# Patient Record
Sex: FEMALE | Race: BLACK OR AFRICAN AMERICAN | Employment: FULL TIME | URBAN - METROPOLITAN AREA
[De-identification: names, ages, dates, MRNs, and addresses within clinical notes are randomized per-mention and may not be internally consistent; named-entity substitution may affect disease eponyms.]

---

## 2020-10-06 ENCOUNTER — APPOINTMENT (OUTPATIENT)
Dept: CT IMAGING | Age: 45
End: 2020-10-06
Payer: COMMERCIAL

## 2020-10-06 ENCOUNTER — HOSPITAL ENCOUNTER (EMERGENCY)
Age: 45
Discharge: HOME | End: 2020-10-06
Attending: EMERGENCY MEDICINE
Payer: COMMERCIAL

## 2020-10-06 ENCOUNTER — APPOINTMENT (OUTPATIENT)
Dept: ULTRASOUND IMAGING | Age: 45
End: 2020-10-06
Payer: COMMERCIAL

## 2020-10-06 VITALS
RESPIRATION RATE: 15 BRPM | SYSTOLIC BLOOD PRESSURE: 187 MMHG | DIASTOLIC BLOOD PRESSURE: 91 MMHG | BODY MASS INDEX: 41.81 KG/M2 | WEIGHT: 275 LBS | OXYGEN SATURATION: 100 % | HEART RATE: 78 BPM | TEMPERATURE: 98.5 F

## 2020-10-06 LAB
ALBUMIN SERPL-MCNC: 4.4 G/DL (ref 3.4–5)
ALP BLD-CCNC: 72 U/L (ref 40–129)
ALT SERPL-CCNC: 18 U/L (ref 10–40)
ANION GAP SERPL CALCULATED.3IONS-SCNC: 10 MMOL/L (ref 3–16)
AST SERPL-CCNC: 18 U/L (ref 15–37)
BACTERIA: ABNORMAL /HPF
BASOPHILS ABSOLUTE: 0 K/UL (ref 0–0.2)
BASOPHILS RELATIVE PERCENT: 0.5 %
BILIRUB SERPL-MCNC: 0.3 MG/DL (ref 0–1)
BILIRUBIN DIRECT: <0.2 MG/DL (ref 0–0.3)
BILIRUBIN URINE: NEGATIVE
BILIRUBIN, INDIRECT: ABNORMAL MG/DL (ref 0–1)
BLOOD, URINE: ABNORMAL
BUN BLDV-MCNC: 10 MG/DL (ref 7–20)
CALCIUM SERPL-MCNC: 9.4 MG/DL (ref 8.3–10.6)
CHLORIDE BLD-SCNC: 100 MMOL/L (ref 99–110)
CLARITY: CLEAR
CO2: 26 MMOL/L (ref 21–32)
COLOR: YELLOW
CREAT SERPL-MCNC: 0.9 MG/DL (ref 0.6–1.1)
EOSINOPHILS ABSOLUTE: 0.1 K/UL (ref 0–0.6)
EOSINOPHILS RELATIVE PERCENT: 1.1 %
EPITHELIAL CELLS, UA: 1 /HPF (ref 0–5)
GFR AFRICAN AMERICAN: >60
GFR NON-AFRICAN AMERICAN: >60
GLUCOSE BLD-MCNC: 163 MG/DL (ref 70–99)
GLUCOSE URINE: NEGATIVE MG/DL
HCT VFR BLD CALC: 37.7 % (ref 36–48)
HEMOGLOBIN: 12.9 G/DL (ref 12–16)
HYALINE CASTS: 1 /LPF (ref 0–8)
KETONES, URINE: NEGATIVE MG/DL
LEUKOCYTE ESTERASE, URINE: NEGATIVE
LIPASE: 28 U/L (ref 13–60)
LYMPHOCYTES ABSOLUTE: 1.9 K/UL (ref 1–5.1)
LYMPHOCYTES RELATIVE PERCENT: 25.8 %
MCH RBC QN AUTO: 33.5 PG (ref 26–34)
MCHC RBC AUTO-ENTMCNC: 34.2 G/DL (ref 31–36)
MCV RBC AUTO: 98 FL (ref 80–100)
MICROSCOPIC EXAMINATION: YES
MONOCYTES ABSOLUTE: 0.4 K/UL (ref 0–1.3)
MONOCYTES RELATIVE PERCENT: 5.7 %
NEUTROPHILS ABSOLUTE: 5 K/UL (ref 1.7–7.7)
NEUTROPHILS RELATIVE PERCENT: 66.9 %
NITRITE, URINE: NEGATIVE
PDW BLD-RTO: 13.7 % (ref 12.4–15.4)
PH UA: 8 (ref 5–8)
PLATELET # BLD: 348 K/UL (ref 135–450)
PMV BLD AUTO: 7.6 FL (ref 5–10.5)
POTASSIUM REFLEX MAGNESIUM: 4.6 MMOL/L (ref 3.5–5.1)
PROTEIN UA: NEGATIVE MG/DL
RBC # BLD: 3.85 M/UL (ref 4–5.2)
RBC UA: 13 /HPF (ref 0–4)
SODIUM BLD-SCNC: 136 MMOL/L (ref 136–145)
SPECIFIC GRAVITY UA: 1.01 (ref 1–1.03)
TOTAL PROTEIN: 8.3 G/DL (ref 6.4–8.2)
TROPONIN: <0.01 NG/ML
URINE TYPE: ABNORMAL
UROBILINOGEN, URINE: 1 E.U./DL
WBC # BLD: 7.4 K/UL (ref 4–11)
WBC UA: 1 /HPF (ref 0–5)

## 2020-10-06 PROCEDURE — 96372 THER/PROPH/DIAG INJ SC/IM: CPT

## 2020-10-06 PROCEDURE — 6360000002 HC RX W HCPCS: Performed by: EMERGENCY MEDICINE

## 2020-10-06 PROCEDURE — 84484 ASSAY OF TROPONIN QUANT: CPT

## 2020-10-06 PROCEDURE — 80048 BASIC METABOLIC PNL TOTAL CA: CPT

## 2020-10-06 PROCEDURE — 99284 EMERGENCY DEPT VISIT MOD MDM: CPT

## 2020-10-06 PROCEDURE — 83690 ASSAY OF LIPASE: CPT

## 2020-10-06 PROCEDURE — 80076 HEPATIC FUNCTION PANEL: CPT

## 2020-10-06 PROCEDURE — 96374 THER/PROPH/DIAG INJ IV PUSH: CPT

## 2020-10-06 PROCEDURE — 96375 TX/PRO/DX INJ NEW DRUG ADDON: CPT

## 2020-10-06 PROCEDURE — 2580000003 HC RX 258: Performed by: EMERGENCY MEDICINE

## 2020-10-06 PROCEDURE — 85025 COMPLETE CBC W/AUTO DIFF WBC: CPT

## 2020-10-06 PROCEDURE — 74176 CT ABD & PELVIS W/O CONTRAST: CPT

## 2020-10-06 PROCEDURE — 81001 URINALYSIS AUTO W/SCOPE: CPT

## 2020-10-06 PROCEDURE — 76705 ECHO EXAM OF ABDOMEN: CPT

## 2020-10-06 RX ORDER — PROMETHAZINE HYDROCHLORIDE 25 MG/ML
6.25 INJECTION, SOLUTION INTRAMUSCULAR; INTRAVENOUS ONCE
Status: COMPLETED | OUTPATIENT
Start: 2020-10-06 | End: 2020-10-06

## 2020-10-06 RX ORDER — MORPHINE SULFATE 4 MG/ML
4 INJECTION, SOLUTION INTRAMUSCULAR; INTRAVENOUS ONCE
Status: COMPLETED | OUTPATIENT
Start: 2020-10-06 | End: 2020-10-06

## 2020-10-06 RX ORDER — SODIUM CHLORIDE, SODIUM LACTATE, POTASSIUM CHLORIDE, CALCIUM CHLORIDE 600; 310; 30; 20 MG/100ML; MG/100ML; MG/100ML; MG/100ML
1000 INJECTION, SOLUTION INTRAVENOUS ONCE
Status: COMPLETED | OUTPATIENT
Start: 2020-10-06 | End: 2020-10-06

## 2020-10-06 RX ORDER — ONDANSETRON 2 MG/ML
4 INJECTION INTRAMUSCULAR; INTRAVENOUS ONCE
Status: COMPLETED | OUTPATIENT
Start: 2020-10-06 | End: 2020-10-06

## 2020-10-06 RX ORDER — FENTANYL CITRATE 50 UG/ML
25 INJECTION, SOLUTION INTRAMUSCULAR; INTRAVENOUS ONCE
Status: COMPLETED | OUTPATIENT
Start: 2020-10-06 | End: 2020-10-06

## 2020-10-06 RX ORDER — ONDANSETRON 4 MG/1
4 TABLET, ORALLY DISINTEGRATING ORAL 3 TIMES DAILY PRN
Qty: 21 TABLET | Refills: 0 | Status: SHIPPED | OUTPATIENT
Start: 2020-10-06

## 2020-10-06 RX ADMIN — SODIUM CHLORIDE, POTASSIUM CHLORIDE, SODIUM LACTATE AND CALCIUM CHLORIDE 1000 ML: 600; 310; 30; 20 INJECTION, SOLUTION INTRAVENOUS at 06:23

## 2020-10-06 RX ADMIN — PROMETHAZINE HYDROCHLORIDE 6.25 MG: 25 INJECTION INTRAMUSCULAR; INTRAVENOUS at 07:55

## 2020-10-06 RX ADMIN — ONDANSETRON 4 MG: 2 INJECTION INTRAMUSCULAR; INTRAVENOUS at 06:23

## 2020-10-06 RX ADMIN — FENTANYL CITRATE 25 MCG: 50 INJECTION INTRAMUSCULAR; INTRAVENOUS at 07:52

## 2020-10-06 RX ADMIN — MORPHINE SULFATE 4 MG: 4 INJECTION, SOLUTION INTRAMUSCULAR; INTRAVENOUS at 06:45

## 2020-10-06 ASSESSMENT — PAIN SCALES - GENERAL
PAINLEVEL_OUTOF10: 6
PAINLEVEL_OUTOF10: 6
PAINLEVEL_OUTOF10: 9
PAINLEVEL_OUTOF10: 9

## 2020-10-06 NOTE — ED NOTES
Reported to Dr. Alka Sarabia, that pt continues to complain about nausea and pain. Pt is on a continuous pulse oximetry and telemetry monitoring. Pt continued on cycling blood pressure. Fall risk precautions in place, call light in reach, bed side table within reach, , will continue to monitor.          [facility]  10/06/20 9575

## 2020-10-06 NOTE — ED NOTES
Pt continues to state she cant not provide urine sample.       Lexi Veras, [place]  10/06/20 3888

## 2020-10-06 NOTE — LETTER
[facility] Emergency Department      [address] [place] [address], [address]            PROOF OF PRESENCE      To Whom It May Concern:    Stacia Killian was present in the Emergency Department at [facility] on 10/6/2020.                                      Sincerely,        Keisha Bautista RN

## 2020-10-06 NOTE — ED NOTES
Reviewed discharge instructions with patient, verbalized understanding, ambulatory at time of discharge.         Leidy Noriega RN  10/06/20 0472

## 2020-10-06 NOTE — LETTER
[facility] Emergency Department  [address] [place] [address], [address]             October 6, 2020    Patient: Radha Emerson   YOB: 1975   Date of Visit: 10/6/2020       To Whom It May Concern: Radha Emerson was seen and treated in our emergency department on 10/6/2020. She may return to work on 10/8/2020.       Sincerely,         Tramaine Blas RN

## 2020-10-06 NOTE — ED NOTES
Pt resting in bed, suspine with emeesis bag, pt state she does not feeling any better after medications. Fluids continue to run. Dr. Audelia Cervantes made aware. Pt rates pain 9/10. Pt states she doesn't feel well enough to collect urine sample. Pt placed on continuous pulse oximetry and  placed on cycling blood pressure. Fall risk precautions in place, call light in reach, bed side table within reach,will continue to monitor.        Patito Funes RN  10/06/20 428 [facility] Doris Topete RN  10/06/20 4414

## 2020-10-06 NOTE — ED NOTES
MD asking this RN to get pt to Ambulated and provide urine sample, patient states she's unable to walk pt then states she can ride in wheelchair to get to bathroom, patient then provided urine sample, however continued to refuse to ambulate. MD made aware.       Tiki Douglass RN  10/06/20 9731

## 2020-10-06 NOTE — ED PROVIDER NOTES
[address] [facility]  EMERGENCY DEPARTMENTENCOUNTER      Pt Name: Nithya Mcleod  MRN: [id number]  [place] 1975  Date ofevaluation: 10/6/2020  Provider: Dorothy Moran MD    CHIEF COMPLAINT       Chief Complaint   Patient presents with    Emesis     pt states having generalized abominal pain and n/v \"all night\". denies any diarrhea. HPI    HISTORY OF PRESENT ILLNESS   (Location/Symptom, Timing/Onset,Context/Setting, Quality, Duration, Modifying Factors, Severity)  Note limiting factors. Nithya Mcleod is a 39 y.o. female who presents to the emergency department with abdominal pain. This is a 70-year-old female presents with left upper quadrant abdominal pain that started this morning. The patient's  states that she was eating some spicy wings last night. She denies any lower abdominal pain she is had some nausea vomiting as well. She denies any fevers or chills. NursingNotes were reviewed. Review of Systems    REVIEW OF SYSTEMS    (2-9 systems for level 4, 10 or more for level 5)     Review of Systems   Constitutional: Negative for fever. HENT: Negative for rhinorrhea and sore throat. Eyes: Negative for redness. Respiratory: Negative for shortness of breath. Cardiovascular: Negative for chest pain. Gastrointestinal: Positive for abdominal pain. Genitourinary: Negative for flank pain. Neurological: Negative for headaches. Hematological: Negative for adenopathy. Psychiatric/Behavioral: Negative for confusion. Except as noted above the remainder of the review of systems was reviewed and negative. PAST MEDICAL HISTORY   History reviewed. No pertinent past medical history. SURGICALHISTORY     History reviewed. No pertinent surgical history. CURRENT MEDICATIONS       Previous Medications    No medications on file       ALLERGIES     Patient has no known allergies.     FAMILY HISTORY     History reviewed. No pertinent family history. SOCIAL HISTORY       Social History     Socioeconomic History    Marital status:      Spouse name: None    Number of children: None    Years of education: None    Highest education level: None   Occupational History    None   Social Needs    Financial resource strain: None    Food insecurity     Worry: None     Inability: None    Transportation needs     Medical: None     Non-medical: None   Tobacco Use    Smoking status: Never Smoker    Smokeless tobacco: Never Used   Substance and Sexual Activity    Alcohol use: No    Drug use: No    Sexual activity: None   Lifestyle    Physical activity     Days per week: None     Minutes per session: None    Stress: None   Relationships    Social connections     Talks on phone: None     Gets together: None     Attends [employer] witness service: None     Active member of club or organization: None     Attends meetings of clubs or organizations: None     Relationship status: None    Intimate partner violence     Fear of current or ex partner: None     Emotionally abused: None     Physically abused: None     Forced sexual activity: None   Other Topics Concern    None   Social History Narrative    None       SCREENINGS             PHYSICAL EXAM    (up to 7 for level 4, 8 or more for level 5)     ED Triage Vitals   BP Temp Temp Source Pulse Resp SpO2 Height Weight   10/06/20 0553 10/06/20 0556 10/06/20 0556 10/06/20 0553 10/06/20 0551 10/06/20 0551 -- 10/06/20 0551   (!) 159/86 97.8 °F (36.6 °C) Oral 62 17 98 %  275 lb (124.7 kg)       Physical Exam:      General Appearance:  Alert, cooperative, appears stated age. Head:  Normocephalic, without obvious abnormality, atraumatic. Eyes:  conjunctiva/corneas clear, EOM's intact. Sclera anicteric. ENT:  Mucous membranes are moist and pink   Neck: Supple, symmetrical, trachea midline, no adenopathy. No jugular venous distention.      Lungs:    Clear to auscultation bilaterally   Chest Wall:   No pain to palpation   Heart:   Genitourinary:  Regular rate rhythm with no murmurs rubs gallops  Deferred   Abdomen:    No pain to palpation. Minimal pain in the left upper quadrant. Positive bowel sounds. No hepatosplenomegaly. No pain over McBurney's point. Extremities:  No clubbing cyanosis or edema   Pulses:  Good throughout   Skin:  No rashes or lesions to exposed skin. Neurologic: Alert and oriented X 3. DIAGNOSTIC RESULTS         RADIOLOGY:   Non-plain filmimages such as CT, Ultrasound and MRI are read by the radiologist. Plain radiographic images are visualized and preliminarily interpreted by the emergency physician with the below findings:    See below    Interpretation per the Radiologist below, if available at the time ofthis note: All incidental findings were discussed with the patient. US GALLBLADDER RUQ   Final Result   Fatty liver. Cholelithiasis. No acute cholecystitis. CT ABDOMEN PELVIS WO CONTRAST Additional Contrast? None   Preliminary Result   1. Distended gallbladder with gallstones. Further evaluation with   gallbladder ultrasound recommended. 2. Mild colonic diverticulosis. 3. Fatty liver. 4. Normal appendix. 5. No evidence for small bowel obstruction. 6. Mild bibasilar atelectasis and respiratory motion. 7. Cardiomegaly.                ED BEDSIDE ULTRASOUND:   Performed by ED Physician - none    LABS:  Labs Reviewed   CBC WITH AUTO DIFFERENTIAL - Abnormal; Notable for the following components:       Result Value    RBC 3.85 (*)     All other components within normal limits    Narrative:     Performed at:  [facility] [address], [place], [address]   Phone [phone]   BASIC METABOLIC PANEL W/ REFLEX TO MG FOR LOW K - Abnormal; Notable for the following components:    Glucose 163 (*)     All other components within normal limits    Narrative:     Performed at:  [facility]  [address] [place],  [place], [address] [employer] [facility]   Phone [phone]   HEPATIC FUNCTION PANEL - Abnormal; Notable for the following components: Total Protein 8.3 (*)     All other components within normal limits    Narrative:     Performed at:  [facility]  [address] [place], [address]   Phone [phone]   URINALYSIS - Abnormal; Notable for the following components:    Blood, Urine SMALL (*)     All other components within normal limits    Narrative:     Performed at:  [facility] [address], [address] [employer] [facility]   Phone [phone]   MICROSCOPIC URINALYSIS - Abnormal; Notable for the following components:    Bacteria, UA 1+ (*)     RBC, UA 13 (*)     All other components within normal limits    Narrative:     Performed at:  [facility] [address]  [place], [address]   Phone [phone]   TROPONIN    Narrative:     Performed at:  [facility] [address] [facility] [facility], [address]   Phone [phone]   LIPASE    Narrative:     Performed at:  [facility] [address] [facility]  [place], [address]   Phone [phone]       All other labs were within normal range or not returned as of this dictation. EMERGENCY DEPARTMENT COURSE and DIFFERENTIAL DIAGNOSIS/MDM:   Vitals:    Vitals:    10/06/20 0556 10/06/20 0830 10/06/20 0900 10/06/20 1000   BP:  (!) 151/81 (!) 160/80 (!) 160/76   Pulse:   66    Resp:   14    Temp: 97.8 °F (36.6 °C)      TempSrc: Oral      SpO2:  100% 100% 100%   Weight:               MDM    Patient has remained stable throughout her hospital course. Her work-up was extensive. A CT of the abdomen pelvis showed no acute findings except for some gallstones. A right upper quadrant gallbladder ultrasound was obtained that shows no evidence of cholecystitis.   She does have [address] lithiasis. The patient's work-up is not consistent with cholecystitis. The patient be discharged with some nausea medication and referral back to her primary care physician. She is to return if worse. I will also give the patient surgical referral.    REASSESSMENT              CONSULTS:  None    PROCEDURES:  Unless otherwise noted below, none     Procedures    FINAL IMPRESSION      1. Left upper quadrant abdominal pain    2. Non-intractable vomiting with nausea, unspecified vomiting type          DISPOSITION/PLAN   DISPOSITION Decision To Discharge 10/06/2020 12:12:56 PM      PATIENT REFERREDTO:  [facility]  [phone]  Call in 2 days  As needed      DISCHARGEMEDICATIONS:  New Prescriptions    ONDANSETRON (ZOFRAN-ODT) 4 MG DISINTEGRATING TABLET    Place 1 tablet under the tongue 3 times daily as needed for Nausea or Vomiting     Controlled Substances Monitoring:     No flowsheet data found.     (Please note that portions of this note were completed with a voice recognition program.  Efforts were made to edit the dictations but occasionally words are mis-transcribed.)    Isatu Barcenas MD (electronically signed)  Attending Emergency Physician          Isatu Barcenas MD  10/06/20 9160

## 2020-10-06 NOTE — ED NOTES
Pt sleeping but states her pain is still a 9/10. Pt also states nausea but falls back to sleep. No emesis since phenergan. Pt will not attempted to get up to collect urine. Pt is on a continuous pulse oximetry and telemetry monitoring. Pt continued on cycling blood pressure. Fall risk precautions in place, call light in reach, bed side table within reach,will continue to monitor.          [facility]  10/06/20 1023

## 2021-12-30 ENCOUNTER — APPOINTMENT (OUTPATIENT)
Dept: GENERAL RADIOLOGY | Age: 46
End: 2021-12-30

## 2021-12-30 ENCOUNTER — HOSPITAL ENCOUNTER (EMERGENCY)
Age: 46
Discharge: HOME | End: 2021-12-30

## 2021-12-30 VITALS
SYSTOLIC BLOOD PRESSURE: 169 MMHG | RESPIRATION RATE: 18 BRPM | DIASTOLIC BLOOD PRESSURE: 89 MMHG | BODY MASS INDEX: 46.38 KG/M2 | TEMPERATURE: 98.6 F | WEIGHT: 293 LBS | HEART RATE: 89 BPM | OXYGEN SATURATION: 99 %

## 2021-12-30 DIAGNOSIS — S92.255A CLOSED NONDISPLACED FRACTURE OF NAVICULAR BONE OF LEFT FOOT, INITIAL ENCOUNTER: Primary | ICD-10-CM

## 2021-12-30 PROCEDURE — 99284 EMERGENCY DEPT VISIT MOD MDM: CPT

## 2021-12-30 PROCEDURE — 29515 APPLICATION SHORT LEG SPLINT: CPT

## 2021-12-30 PROCEDURE — 6370000000 HC RX 637 (ALT 250 FOR IP): Performed by: PHYSICIAN ASSISTANT

## 2021-12-30 PROCEDURE — 73610 X-RAY EXAM OF ANKLE: CPT

## 2021-12-30 RX ORDER — NAPROXEN 250 MG/1
500 TABLET ORAL ONCE
Status: COMPLETED | OUTPATIENT
Start: 2021-12-30 | End: 2021-12-30

## 2021-12-30 RX ORDER — HYDROCODONE BITARTRATE AND ACETAMINOPHEN 5; 325 MG/1; MG/1
.5-1 TABLET ORAL EVERY 6 HOURS PRN
Qty: 10 TABLET | Refills: 0 | Status: SHIPPED | OUTPATIENT
Start: 2021-12-30 | End: 2022-01-02

## 2021-12-30 RX ORDER — ACETAMINOPHEN 500 MG
1000 TABLET ORAL ONCE
Status: COMPLETED | OUTPATIENT
Start: 2021-12-30 | End: 2021-12-30

## 2021-12-30 RX ADMIN — ACETAMINOPHEN 1000 MG: 500 TABLET ORAL at 15:07

## 2021-12-30 RX ADMIN — NAPROXEN 500 MG: 250 TABLET ORAL at 15:07

## 2021-12-30 ASSESSMENT — PAIN SCALES - GENERAL: PAINLEVEL_OUTOF10: 10

## 2021-12-30 NOTE — ED PROVIDER NOTES
[facility]        Pt Name: Krish Garrett  MRN: [id number]  [place] 1975  Date of evaluation: 12/30/2021  Provider: Sydni Rivera PA-C  [facility] ([facility])  Note Started: 3:31 PM EST       KYM. I have evaluated this patient. My supervising physician was available for consultation. CHIEF COMPLAINT       Chief Complaint   Patient presents with    Fall     Pt fell down roughly 4 steps, pain to left ankle up to knee        HISTORY OF PRESENT ILLNESS   (Location, Timing/Onset, Context/Setting, Quality, Duration, Modifying Factors, Severity, Associated Signs and Symptoms)  Note limiting factors. Chief Complaint: Left ankle pain    Krish Garrett is a 55 y.o. female who presents to the emergency department the chief complaint of some left ankle pain. She fell down approximately 4 steps directly well in her left leg with foot underneath her. She denies use of anticoagulants, hitting her head, loss of conscious, wounds. She is not taking anything for the pain yet. Rates the pain a 10 out of 10. Denies any previous history of injuries or surgeries to her left leg. States the pain is coming from the ankle but radiates up her leg to about the knee. Nursing Notes were all reviewed and agreed with or any disagreements were addressed in the HPI. REVIEW OF SYSTEMS    (2-9 systems for level 4, 10 or more for level 5)     Review of Systems    Positives and Pertinent negatives as per HPI. Except as noted above in the ROS, all other systems were reviewed and negative. PAST MEDICAL HISTORY   History reviewed. No pertinent past medical history. SURGICAL HISTORY   History reviewed. No pertinent surgical history.       [address]       Discharge Medication List as of 12/30/2021  4:55 PM      CONTINUE these medications which have NOT CHANGED    Details   ondansetron (ZOFRAN-ODT) 4 MG disintegrating tablet Place 1 tablet under the tongue 3 times daily as needed for Nausea or Vomiting, Disp-21 tablet,R-0Print               ALLERGIES     Patient has no known allergies. FAMILYHISTORY     History reviewed. No pertinent family history. SOCIAL HISTORY       Social History     Tobacco Use    Smoking status: Never Smoker    Smokeless tobacco: Never Used   Substance Use Topics    Alcohol use: No    Drug use: No       SCREENINGS             PHYSICAL EXAM    (up to 7 for level 4, 8 or more for level 5)     ED Triage Vitals 12/30/21 1418   BP Temp Temp src Pulse Resp SpO2 Height Weight   (!) 169/89 98.6 °F (37 °C) -- 89 18 99 % -- (!) 305 lb (138.3 kg)       Physical Exam  Vitals and nursing note reviewed. Constitutional:       Appearance: She is well-developed. She is not diaphoretic. HENT:      Head: Atraumatic. Nose: Nose normal.   Eyes:      General:         Right eye: No discharge. Left eye: No discharge. Cardiovascular:      Pulses: Normal pulses. Comments: 2+ dorsal pedal pulses left foot  Musculoskeletal:         General: Tenderness present. Cervical back: Normal range of motion. Comments: Some tenderness and swelling over the lateral aspect of the left ankle over the malleolus. No tenderness or deformity over the medial malleolus or Achilles. No tenderness of the left foot, knee. No obvious deformity, joint warmth, erythema or rash. Sensation light touch intact. Skin:     General: Skin is warm and dry. Findings: No erythema or rash. Neurological:      Mental Status: She is alert and oriented to person, place, and time. Cranial Nerves: No cranial nerve deficit. Psychiatric:         Behavior: Behavior normal.         DIAGNOSTIC RESULTS   LABS:    Labs Reviewed - No data to display    When ordered only abnormal lab results are displayed. All other labs were within normal range or not returned as of this dictation. EKG:  When ordered, EKG's are interpreted by the Emergency Department Physician in the absence of a cardiologist.  Please see their note for interpretation of EKG. RADIOLOGY:   Non-plain film images such as CT, Ultrasound and MRI are read by the radiologist. Plain radiographic images are visualized and preliminarily interpreted by the ED Provider with the below findings:        Interpretation per the Radiologist below, if available at the time of this note:    XR ANKLE LEFT (MIN 3 VIEWS)   Final Result   Traumatic, acute, nondisplaced fracture of the navicular. PROCEDURES   Unless otherwise noted below, none     Procedures   A posterior OCL splint was placed by emergency department technician. Splint placement was checked by myself and is in good placement and patient is distally neurovascular intact after placement. CRITICAL CARE TIME   N/A    CONSULTS:  None      EMERGENCY DEPARTMENT COURSE and DIFFERENTIAL DIAGNOSIS/MDM:   Vitals:    Vitals:    12/30/21 1418   BP: (!) 169/89   Pulse: 89   Resp: 18   Temp: 98.6 °F (37 °C)   SpO2: 99%   Weight: (!) 305 lb (138.3 kg)       Patient was given the following medications:  Medications   naproxen (NAPROSYN) tablet 500 mg (500 mg Oral Given 12/30/21 1507)   acetaminophen (TYLENOL) tablet 1,000 mg (1,000 mg Oral Given 12/30/21 1507)           Patient presented with some left ankle pain after a fall. She has a fracture of the navicular that is nondisplaced. After placement received a splint she is easily able to flex the knee and states she is feeling better. Is a closed injury and she has no pain over her knee to palpation. There is risk for any other acute injury. She will follow-up with orthopedics. This is a closed injury. She will return here for any worsening of symptoms or problems at home. FINAL IMPRESSION      1.  Closed nondisplaced fracture of navicular bone of left foot, initial encounter          DISPOSITION/PLAN   DISPOSITION Decision To Discharge 12/30/2021 04:49:17 PM      PATIENT REFERRED TO:  Harry Vee MD  [facility]  [address]  [phone]    Schedule an appointment as soon as possible for a visit   For re-check 3-5 days    [facility] Emergency Department  [address] [place] [address]  [phone]    As needed      DISCHARGE MEDICATIONS:  Discharge Medication List as of 12/30/2021  4:55 PM      START taking these medications    Details   HYDROcodone-acetaminophen (NORCO) 5-325 MG per tablet Take 0.5-1 tablets by mouth every 6 hours as needed for Pain for up to 3 days. , Disp-10 tablet, R-0Normal             DISCONTINUED MEDICATIONS:  Discharge Medication List as of 12/30/2021  4:55 PM                 (Please note that portions of this note were completed with a voice recognition program.  Efforts were made to edit the dictations but occasionally words are mis-transcribed.)    Ivon Sharma PA-C (electronically signed)            Ivon Sharma PA-C  12/30/21 1947

## 2022-01-04 ENCOUNTER — OFFICE VISIT (OUTPATIENT)
Dept: ORTHOPEDIC SURGERY | Age: 47
End: 2022-01-04

## 2022-01-04 VITALS — HEIGHT: 68 IN | WEIGHT: 293 LBS | BODY MASS INDEX: 44.41 KG/M2

## 2022-01-04 DIAGNOSIS — S92.255A CLOSED NONDISPLACED FRACTURE OF NAVICULAR BONE OF LEFT FOOT, INITIAL ENCOUNTER: Primary | ICD-10-CM

## 2022-01-04 PROCEDURE — L4361 PNEUMA/VAC WALK BOOT PRE OTS: HCPCS | Performed by: PHYSICIAN ASSISTANT

## 2022-01-04 PROCEDURE — 99203 OFFICE O/P NEW LOW 30 MIN: CPT | Performed by: PHYSICIAN ASSISTANT

## 2022-01-04 NOTE — PROGRESS NOTES
Patient Name: Selina Mason  Medical Record Number: [id number]  YOB: 1975  Date of Encounter: 1/4/2022     Chief Complaint   Patient presents with    New Patient     Left foot, fell down stairs on 12/30/21. History of Present Illness: Selina Mason is a 55 y.o. female here for evaluation of her left foot. Her pain assessment is documented below and I reviewed this with her today. Patient went to the emergency department on 12/30/2021 stating just prior to arrival she stumbled and fell down 4 stairs at her home. She injured her left foot in the process. She had x-rays completed showing a nondisplaced fracture of the navicular. Patient was fitted for a posterior OCL splint and crutches. She presents today stating her pain can go up to an 8/10. She is walking on her OCL splint. Pain Assessment  Location Modifiers: Left  Severity of Pain: 8  Quality of Pain: Aching,Sharp  Duration of Pain: Persistent  Frequency of Pain: Constant  Date Pain First Started: 12/30/21  Aggravating Factors: Walking  Limiting Behavior: Some  Relieving Factors: Rest  Result of Injury: Yes  Work-Related Injury: No  Are there other pain locations you wish to document?: No    History reviewed. No pertinent past medical history. History reviewed. No pertinent surgical history. Current Outpatient Medications   Medication Sig Dispense Refill    ondansetron (ZOFRAN-ODT) 4 MG disintegrating tablet Place 1 tablet under the tongue 3 times daily as needed for Nausea or Vomiting 21 tablet 0     No current facility-administered medications for this visit. Allergies, social and family histories were reviewed and updated as appropriate. Review of Systems:  Relevant review of systems reviewed and available in the patient's chart under the 'MEDIA' tab.     Vital Signs:  Ht 5' 8\" (1.727 m)   Wt (!) 321 lb 12.8 oz (146 kg)   BMI 48.93 kg/m²     General Exam:   Constitutional: Patient is adequately groomed with no evidence of malnutrition  Mental Status: The patient is oriented to time, place and person. The patient's mood and affect are appropriate. Lymphatic: The lymphatic examination bilaterally reveals all areas to be without enlargement or induration. Vascular: Examination reveals no swelling or calf tenderness. Peripheral pulses are palpable and 2+. Neurological: The patient has good coordination. There is no focal weakness or sensory deficit. Left Foot Examination:    Inspection: Normal ankle and foot alignment. Normal muscle contours and no significant limb length discrepancy. No gross atrophy in any particular myotome. There is mild swelling of the left foot. There are no abrasions, lacerations, contusions, hematomas or ecchymosis. There is no erythema, induration or warmth to suggest an infectious process. Palpation: Patient has tenderness on palpation of the entire midfoot    Ankle Range of Motion: Patient is hesitant to perform ankle range of motion exercises     Ankle Strength: Unable to test secondary to pain    Skin: There are no rashes, ulcerations or lesions. Sensation to light touch intact    Circulation: The limb is warm and well perfused. Distal pulses intact. Capillary refill is intact. Edema: none. Venous stasis changes: none. Gait: Patient has a antalgic gait and is taking short strides. Radiology:     Left foot x-rays completed in the emergency department on 12/30/2021 and reviewed in office today:       FINDINGS:   The ankle mortise is symmetrical.  There are ossicle-like projections of the   medial malleolus, consistent with remote injuries. Lexus Rivers is a transversely   oriented lucency along the superior aspect of the navicular.  Articular   surfaces appear congruent.  Calcaneal enthesopathy. Orders:  Orders Placed This Encounter   Procedures    Breg Short Isatu Walking Boot     Patient was prescribed a Breg Short Isatu Walking Boot.   The left foot will require stabilization / immobilization from this semi-rigid / rigid orthosis to improve their function. The orthosis will assist in protecting the affected area, provide functional support and facilitate healing. Patient was instructed to progress ambulation weight bearing as tolerated in the device. The patient was educated and fit by a healthcare professional with expert knowledge and specialization in brace application while under the direct supervision of the physician. Verbal and written instructions for the use of and application of this item were provided. They were instructed to contact the office immediately should the brace result in increased pain, decreased sensation, increased swelling or worsening of the condition. Impression:   Diagnosis Orders   1. Closed nondisplaced fracture of navicular bone of left foot, initial encounter  Jonny Short Isatu Walking Boot       Treatment Plan:          Patient sustained a nondisplaced left foot navicular fracture on 12/30/2021. Her fiberglass splint is removed. She states pain is improving. She is fitted for a walking boot. She can weight-bear as tolerated on her heel. She will minimize weightbearing as much as possible. She is advised to continue resting, icing and elevating the left foot. Advised patient she can remove the boot to shower and sleep. Otherwise, she will remove the boot when sitting and start performing ankle range of motion exercises. Patient will plan on following back in 2 weeks at which time we will repeat x-rays of the left foot. She is advised to follow back before then with any concerns. Vania Garcia was informed of the results of any imaging. We discussed treatment options and a time was given to answer questions. A plan was proposed and Vania Garcia understand and accepts this course of care.         Electronically signed by Eda Gavin PA-C on 2/5/0236  Board Certified Physician [facility]    Please note that portions of this note were completed with a voice recognition program.  Efforts were made to edit the dictations but occasionally words are mis-transcribed.

## 2022-01-20 ENCOUNTER — OFFICE VISIT (OUTPATIENT)
Dept: ORTHOPEDIC SURGERY | Age: 47
End: 2022-01-20

## 2022-01-20 VITALS — BODY MASS INDEX: 44.41 KG/M2 | HEIGHT: 68 IN | WEIGHT: 293 LBS

## 2022-01-20 DIAGNOSIS — S92.255D CLOSED NONDISPLACED FRACTURE OF NAVICULAR BONE OF LEFT FOOT WITH ROUTINE HEALING, SUBSEQUENT ENCOUNTER: Primary | ICD-10-CM

## 2022-01-20 PROCEDURE — 99213 OFFICE O/P EST LOW 20 MIN: CPT | Performed by: PHYSICIAN ASSISTANT

## 2022-01-20 NOTE — PROGRESS NOTES
Patient Name: Cortney Millan  Medical Record Number: [id number]  YOB: 1975  Date of Encounter: 1/20/2022     Chief Complaint   Patient presents with    Follow-up     fu left foot fx       History of Present Illness:   Ms. Cortney Millan is here in 3 week follow up regarding her left foot nondisplaced navicular fracture she sustained on 12/30/2021 when she stumbled and fell down 4 stairs. After discussing the case with Dr. Bette Wilson, the decision was made to proceed nonoperatively. Patient was fitted for a walking boot and told she could weight-bear as tolerated on her heel. She presents today stating the pain has improved tremendously. She states it is just more irritating than painful. She is still wearing her walking boot. The patient's past medical history, medications, allergies, family history, social history, and review of systems have been reviewed, and dated and are recorded in the chart under the 'MEDIA\" tab. Physical Exam:    Ms. Cortney Millan appears well, she is in no apparent distress, she demonstrates appropriate mood & affect. She is alert and oriented to person, place and time. Ht 5' 8\" (1.727 m)   Wt (!) 321 lb (145.6 kg)   BMI 48.81 kg/m²     On examination of patient's left foot there is no obvious swelling or joint effusion. Patient denies tenderness on palpation of the navicular. She still has very good functional range of motion and strength of the left ankle. She is able to move all of her toes. Capillary refill <2 seconds. She denies sensory deficits in the left foot. There is no lower extremity edema or signs of DVT. Radiology:  X-rays obtained and reviewed in office:   Views: 3 view left foot including AP, lateral and oblique  Impression: There is a stable fracture of the navicular. There is no additional displacement. There are some evidence of bone healing.     Orders  Orders Placed This Encounter   Procedures    XR FOOT LEFT (MIN 3 VIEWS)     3V Lt Foot    Room 3     Standing Status:   Future     Number of Occurrences:   1     Standing Expiration Date:   1/20/2023     Order Specific Question:   Reason for exam:     Answer: Foot Pain       Impression:   Diagnosis Orders   1. Closed nondisplaced fracture of navicular bone of left foot with routine healing, subsequent encounter  XR FOOT LEFT (MIN 3 VIEWS)       Treatment Plan:    Patient has a stable, healing navicular fracture of the left foot. Her pain has improved significantly. She is advised to continue wearing the walking boot. She can weight-bear as tolerated. She will continue coming out of the boot to maintain left ankle and foot range of motion. She is advised to continue resting, icing and elevating as needed. She will plan on following back in 3 weeks or before that time with any concerns. We will repeat x-rays of the left foot at that time. If x-rays are stable and pain is still minimal then she should be able to transition back into a tennis shoe. HAMLETisaac Jeferson was informed of the results of any imaging. We discussed treatment options and a time was given to answer questions. A plan was proposed and Dougie Govea understand and accepts this course of care. Electronically signed by Katie Ko PA-C on 9/20/2077  Board Certified [facility]    Please note that portions of this note were completed with a voice recognition program.  Efforts were made to edit the dictations but occasionally words are mis-transcribed.

## 2022-03-10 ENCOUNTER — OFFICE VISIT (OUTPATIENT)
Dept: ORTHOPEDIC SURGERY | Age: 47
End: 2022-03-10

## 2022-03-10 VITALS — HEIGHT: 68 IN | WEIGHT: 293 LBS | BODY MASS INDEX: 44.41 KG/M2

## 2022-03-10 DIAGNOSIS — S92.255D CLOSED NONDISPLACED FRACTURE OF NAVICULAR BONE OF LEFT FOOT WITH ROUTINE HEALING, SUBSEQUENT ENCOUNTER: Primary | ICD-10-CM

## 2022-03-10 PROCEDURE — 99213 OFFICE O/P EST LOW 20 MIN: CPT | Performed by: PHYSICIAN ASSISTANT

## 2022-03-10 NOTE — PROGRESS NOTES
Patient Name: Kristan Mata  Medical Record Number: [id number]  YOB: 1975  Date of Encounter: 3/10/2022     Chief Complaint   Patient presents with    Follow-up     Left foot navicular fracture, still has some soreness, stopped wearing boot 4 days ago       History of Present Illness:   Ms. Kristan Mata is here in 10 week follow up regarding her left foot nondisplaced navicular fracture she sustained on 12/30/2021 when she stumbled and fell down 4 stairs. After discussing the case with Dr. Daniel Newby, the decision was made to proceed nonoperatively. Patient was fitted for a walking boot and told she could weight-bear as tolerated on her heel. She presents today stating she is having very minimal intermittent pain. She is no longer wearing her walking boot. She states she is still working through some ankle and foot stiffness. The patient's past medical history, medications, allergies, family history, social history, and review of systems have been reviewed, and dated and are recorded in the chart under the 'MEDIA\" tab. Physical Exam:    Ms. Kristan Mata appears well, she is in no apparent distress, she demonstrates appropriate mood & affect. She is alert and oriented to person, place and time. Ht 5' 8\" (1.727 m)   Wt (!) 321 lb (145.6 kg)   BMI 48.81 kg/m²     On examination of patient's left foot there is really no swelling or joint effusion. She denies tenderness on palpation of the left foot or ankle. She has very good functional range of motion and strength of the left ankle.     Radiology:  X-rays obtained and reviewed in office:   Views: 3 view left foot including AP, lateral and oblique  Impression: There is a stable, healing navicular fracture    Orders  Orders Placed This Encounter   Procedures    XR FOOT LEFT (MIN 3 VIEWS)     Standing Status:   Future     Number of Occurrences:   1     Standing Expiration Date:   4/10/2022       Impression:   Diagnosis Orders 1. Closed nondisplaced fracture of navicular bone of left foot with routine healing, subsequent encounter  XR FOOT LEFT (MIN 3 VIEWS)       Treatment Plan:    Patient is 10 weeks out from her initial injury that resulted in a left foot navicular fracture. Repeat x-rays today show a stable healing fracture. Patient denies having any pain in the foot at this time. She is no longer wearing her walking boot. She will continue working on range of motion and strengthening of the left foot. She is advised to continue avoiding any high impact or strenuous activity. She will gradually increase her activity. Patient should not require a scheduled follow-up visit but is advised to follow back at any point with any concerns. Haley Ortiz was informed of the results of any imaging. We discussed treatment options and a time was given to answer questions. A plan was proposed and Haley Ortiz understand and accepts this course of care. Electronically signed by Pooja Melendez PA-C on 8/86/3767  Board Certified [facility]    Please note that portions of this note were completed with a voice recognition program.  Efforts were made to edit the dictations but occasionally words are mis-transcribed.